# Patient Record
Sex: MALE | Race: WHITE | NOT HISPANIC OR LATINO | URBAN - METROPOLITAN AREA
[De-identification: names, ages, dates, MRNs, and addresses within clinical notes are randomized per-mention and may not be internally consistent; named-entity substitution may affect disease eponyms.]

---

## 2017-07-26 ENCOUNTER — OUTPATIENT (OUTPATIENT)
Dept: OUTPATIENT SERVICES | Facility: HOSPITAL | Age: 42
LOS: 1 days | End: 2017-07-26
Payer: COMMERCIAL

## 2017-07-26 ENCOUNTER — APPOINTMENT (OUTPATIENT)
Dept: RADIOLOGY | Facility: IMAGING CENTER | Age: 42
End: 2017-07-26

## 2017-07-26 DIAGNOSIS — Z00.8 ENCOUNTER FOR OTHER GENERAL EXAMINATION: ICD-10-CM

## 2017-07-26 PROCEDURE — 73630 X-RAY EXAM OF FOOT: CPT

## 2018-06-04 ENCOUNTER — OUTPATIENT (OUTPATIENT)
Dept: OUTPATIENT SERVICES | Facility: HOSPITAL | Age: 43
LOS: 1 days | End: 2018-06-04
Payer: COMMERCIAL

## 2018-06-04 ENCOUNTER — APPOINTMENT (OUTPATIENT)
Dept: MRI IMAGING | Facility: IMAGING CENTER | Age: 43
End: 2018-06-04
Payer: COMMERCIAL

## 2018-06-04 DIAGNOSIS — Z00.8 ENCOUNTER FOR OTHER GENERAL EXAMINATION: ICD-10-CM

## 2018-06-04 PROCEDURE — 73721 MRI JNT OF LWR EXTRE W/O DYE: CPT

## 2018-06-04 PROCEDURE — 73721 MRI JNT OF LWR EXTRE W/O DYE: CPT | Mod: 26,RT

## 2018-11-27 ENCOUNTER — TRANSCRIPTION ENCOUNTER (OUTPATIENT)
Age: 43
End: 2018-11-27

## 2019-01-01 ENCOUNTER — TRANSCRIPTION ENCOUNTER (OUTPATIENT)
Age: 44
End: 2019-01-01

## 2019-09-04 ENCOUNTER — LABORATORY RESULT (OUTPATIENT)
Age: 44
End: 2019-09-04

## 2019-09-04 ENCOUNTER — APPOINTMENT (OUTPATIENT)
Dept: PEDIATRIC ALLERGY IMMUNOLOGY | Facility: CLINIC | Age: 44
End: 2019-09-04
Payer: COMMERCIAL

## 2019-09-04 ENCOUNTER — NON-APPOINTMENT (OUTPATIENT)
Age: 44
End: 2019-09-04

## 2019-09-04 VITALS
DIASTOLIC BLOOD PRESSURE: 82 MMHG | SYSTOLIC BLOOD PRESSURE: 132 MMHG | HEART RATE: 90 BPM | WEIGHT: 206.99 LBS | OXYGEN SATURATION: 96 % | BODY MASS INDEX: 30.66 KG/M2 | HEIGHT: 69 IN

## 2019-09-04 DIAGNOSIS — Z13.0 ENCOUNTER FOR SCREENING FOR OTHER SUSPECTED ENDOCRINE DISORDER: ICD-10-CM

## 2019-09-04 DIAGNOSIS — Z13.228 ENCOUNTER FOR SCREENING FOR OTHER SUSPECTED ENDOCRINE DISORDER: ICD-10-CM

## 2019-09-04 DIAGNOSIS — J33.9 UNSPECIFIED ASTHMA, UNCOMPLICATED: ICD-10-CM

## 2019-09-04 DIAGNOSIS — J30.89 OTHER ALLERGIC RHINITIS: ICD-10-CM

## 2019-09-04 DIAGNOSIS — Z82.5 FAMILY HISTORY OF ASTHMA AND OTHER CHRONIC LOWER RESPIRATORY DISEASES: ICD-10-CM

## 2019-09-04 DIAGNOSIS — J30.81 ALLERGIC RHINITIS DUE TO ANIMAL (CAT) (DOG) HAIR AND DANDER: ICD-10-CM

## 2019-09-04 DIAGNOSIS — J33.9 NASAL POLYP, UNSPECIFIED: ICD-10-CM

## 2019-09-04 DIAGNOSIS — J30.1 ALLERGIC RHINITIS DUE TO POLLEN: ICD-10-CM

## 2019-09-04 DIAGNOSIS — Z88.6 UNSPECIFIED ASTHMA, UNCOMPLICATED: ICD-10-CM

## 2019-09-04 DIAGNOSIS — J45.30 MILD PERSISTENT ASTHMA, UNCOMPLICATED: ICD-10-CM

## 2019-09-04 DIAGNOSIS — J32.9 CHRONIC SINUSITIS, UNSPECIFIED: ICD-10-CM

## 2019-09-04 DIAGNOSIS — Z13.29 ENCOUNTER FOR SCREENING FOR OTHER SUSPECTED ENDOCRINE DISORDER: ICD-10-CM

## 2019-09-04 DIAGNOSIS — J45.909 UNSPECIFIED ASTHMA, UNCOMPLICATED: ICD-10-CM

## 2019-09-04 DIAGNOSIS — Z88.6 ALLERGY STATUS TO ANALGESIC AGENT: ICD-10-CM

## 2019-09-04 DIAGNOSIS — R09.81 NASAL CONGESTION: ICD-10-CM

## 2019-09-04 DIAGNOSIS — J33.9 CHRONIC SINUSITIS, UNSPECIFIED: ICD-10-CM

## 2019-09-04 PROCEDURE — 95004 PERQ TESTS W/ALRGNC XTRCS: CPT | Mod: GC

## 2019-09-04 PROCEDURE — 36415 COLL VENOUS BLD VENIPUNCTURE: CPT | Mod: GC

## 2019-09-04 PROCEDURE — 94010 BREATHING CAPACITY TEST: CPT | Mod: GC

## 2019-09-04 PROCEDURE — 95012 NITRIC OXIDE EXP GAS DETER: CPT | Mod: GC

## 2019-09-04 PROCEDURE — 99204 OFFICE O/P NEW MOD 45 MIN: CPT | Mod: GC,25

## 2019-09-04 RX ORDER — BUDESONIDE 32 UG/1
32 SPRAY, METERED NASAL
Refills: 0 | Status: ACTIVE | COMMUNITY

## 2019-09-04 RX ORDER — FLUTICASONE PROPIONATE AND SALMETEROL 50; 100 UG/1; UG/1
100-50 POWDER RESPIRATORY (INHALATION)
Refills: 0 | Status: ACTIVE | COMMUNITY

## 2019-09-04 RX ORDER — PREDNISONE 50 MG/1
TABLET ORAL
Refills: 0 | Status: ACTIVE | COMMUNITY

## 2019-09-04 RX ORDER — FLUTICASONE PROPIONATE AND SALMETEROL XINAFOATE 230; 21 UG/1; UG/1
AEROSOL, METERED RESPIRATORY (INHALATION)
Refills: 0 | Status: DISCONTINUED | COMMUNITY
End: 2019-09-04

## 2019-09-04 RX ORDER — ALBUTEROL 90 MCG
90 AEROSOL (GRAM) INHALATION
Refills: 0 | Status: ACTIVE | COMMUNITY

## 2019-09-04 RX ORDER — MONTELUKAST 10 MG/1
10 TABLET, FILM COATED ORAL
Refills: 0 | Status: ACTIVE | COMMUNITY

## 2019-09-06 ENCOUNTER — RESULT REVIEW (OUTPATIENT)
Age: 44
End: 2019-09-06

## 2019-09-06 LAB
ALBUMIN SERPL ELPH-MCNC: 4.8 G/DL
ALP BLD-CCNC: 83 U/L
ALT SERPL-CCNC: 30 U/L
ANION GAP SERPL CALC-SCNC: 15 MMOL/L
AST SERPL-CCNC: 24 U/L
BASOPHILS # BLD AUTO: 0.03 K/UL
BASOPHILS NFR BLD AUTO: 0.3 %
BILIRUB SERPL-MCNC: 0.4 MG/DL
BUN SERPL-MCNC: 21 MG/DL
CALCIUM SERPL-MCNC: 9.9 MG/DL
CH50 SERPL-MCNC: >98 U/ML
CHLORIDE SERPL-SCNC: 102 MMOL/L
CO2 SERPL-SCNC: 23 MMOL/L
CREAT SERPL-MCNC: 1.07 MG/DL
DEPRECATED KAPPA LC FREE/LAMBDA SER: 1.16 RATIO
EOSINOPHIL # BLD AUTO: 0.02 K/UL
EOSINOPHIL NFR BLD AUTO: 0.2 %
GLUCOSE SERPL-MCNC: 134 MG/DL
HCT VFR BLD CALC: 49.9 %
HGB BLD-MCNC: 16.2 G/DL
IGA SER QL IEP: 165 MG/DL
IGG SER QL IEP: 1088 MG/DL
IGM SER QL IEP: 58 MG/DL
IMM GRANULOCYTES NFR BLD AUTO: 0.4 %
KAPPA LC CSF-MCNC: 0.87 MG/DL
KAPPA LC SERPL-MCNC: 1.01 MG/DL
LYMPHOCYTES # BLD AUTO: 2.04 K/UL
LYMPHOCYTES NFR BLD AUTO: 18.8 %
MAN DIFF?: NORMAL
MCHC RBC-ENTMCNC: 29.2 PG
MCHC RBC-ENTMCNC: 32.5 GM/DL
MCV RBC AUTO: 90.1 FL
MONOCYTES # BLD AUTO: 0.55 K/UL
MONOCYTES NFR BLD AUTO: 5.1 %
MPO AB + PR3 PNL SER: NORMAL
NEUTROPHILS # BLD AUTO: 8.19 K/UL
NEUTROPHILS NFR BLD AUTO: 75.2 %
PLATELET # BLD AUTO: 285 K/UL
POTASSIUM SERPL-SCNC: 3.5 MMOL/L
PROT SERPL-MCNC: 7.5 G/DL
RBC # BLD: 5.54 M/UL
RBC # FLD: 13 %
SODIUM SERPL-SCNC: 140 MMOL/L
TRYPTASE: 7.2 NG/ML
WBC # FLD AUTO: 10.87 K/UL

## 2019-09-09 LAB
COMPLEMENT, ALTERNATE PATHWAY (AH50): 72
LEUKOTRIENE E4, URINE: 118 CD:455662145

## 2019-09-12 LAB
MANNAN BINDING LECTIN (MBL): 594 NG/ML
TOTAL IGE SMQN RAST: 41 KU/L

## 2019-09-12 NOTE — HISTORY OF PRESENT ILLNESS
[Venom Reactions] : venom reactions [Food Allergies] : food allergies [(# ___ in the past year)] : hospitalized [unfilled] times in the past year [( # ___ in the past year)] : intubated [unfilled] times in the past year [0 x/month] : 0 x/month [None] : None [< or = 2 days/wk] : < than or = 2 days/week [0 - 1/year] : 0 - 1/year [de-identified] : Faustino is a 44 year old man referred by Dr. Pelletier from ENT for evaluation of AERD and allergies. \maria fernanda patricia For approximately 10 years had nasal congestion and was seen by ENT and diagnosed with nasal polyps. Has been on prednisone 3 times since March. Prednisone is the only thing that allows him to smell and breath out his nose. ENT recommeneded evaluation prior to surgery. \maria fernanda patricia Has taken ibuprofen intermittently for muscle pains and sports injuries. Years ago he was out fishing and developed worsening of asthma and wheezing and thought it was associated with advil.\par \par Asthma on advair and worse in late summer through fall and worse also with mold and damp musty places. Diagnosed initially in 2006 when his wife moved in with her cats, he developed wheezing. harshad patricia Was allergy tested years ago and allergic to pollens and ragweed. Regularly takes singulair and zyrtec daily. Sometimes will stop taking in the winter months. Also reports itchy eyes, itchy nose, nasal congestion, itchy throat, post nasal drip. Also gets vertigo with severe nasal congestion.\maria fernanda patricia Also as child had intermittent hives and lip swelling. [Shortness of Breath] : no shortness of breath [Dyspnea on Exertion] : no dyspnea on exertion [Chest Pain] : no chest pain [Cough] : no cough [Sputum Production] : non productive cough [Wheezing] : no wheezing

## 2019-09-12 NOTE — PHYSICAL EXAM
[Alert] : alert [Well Nourished] : well nourished [No Acute Distress] : no acute distress [Well Developed] : well developed [Normal Voice/Communication] : normal voice communication [Normal Pupil & Iris Size/Symmetry] : normal pupil and iris size and symmetry [No Discharge] : no discharge [No Photophobia] : no photophobia [Sclera Not Icteric] : sclera not icteric [Normal TMs] : both tympanic membranes were normal [Normal Outer Ear/Nose] : the ears and nose were normal in appearance [No Thrush] : no thrush [No Nasal Discharge] : no nasal discharge [Normal Lips/Tongue] : the lips and tongue were normal [Normal Tonsils] : normal tonsils [Normal Dentition] : normal dentition [No Oral Lesions or Ulcers] : no oral lesions or ulcers [No Neck Mass] : no neck mass was observed [No LAD] : no lymphadenopathy [Supple] : the neck was supple [Normal Rate and Effort] : normal respiratory rhythm and effort [No Crackles] : no crackles [Bilateral Audible Breath Sounds] : bilateral audible breath sounds [No Retractions] : no retractions [Normal Rate] : heart rate was normal  [Normal S1, S2] : normal S1 and S2 [No murmur] : no murmur [No Rubs] : no pericardial rub [Regular Rhythm] : with a regular rhythm [Skin Intact] : skin intact  [Normal Cervical Lymph Nodes] : cervical [No Skin Lesions] : no skin lesions [No Rash] : no rash [No Induration] : no induration [No Joint Swelling or Erythema] : no joint swelling or erythema [No Motor Deficits] : the motor exam was normal [Alert, Awake, Oriented as Age-Appropriate] : alert, awake, oriented as age appropriate [Conjunctival Erythema] : no conjunctival erythema [Suborbital Bogginess] : no suborbital bogginess (allergic shiners) [Pharyngeal erythema] : no pharyngeal erythema [Exudate] : no exudate [Clear Rhinorrhea] : no clear rhinorrhea was seen [Eczematous Patches] : no eczematous patches [Wheezing] : no wheezing was heard [Xerosis] : no xerosis [de-identified] : +large nasal polyps in bilateral nares

## 2019-09-12 NOTE — SOCIAL HISTORY
[Spouse/Partner] : spouse/partner [House] : [unfilled] lives in a house  [Radiator/Baseboard] : heating provided by radiator(s)/baseboard(s) [Window Units] : air conditioning provided by window units [Dry] : dry [None] : none [Previous Pets ___] : previously owned pets [unfilled] [] :  [de-identified] : 2 kids [Dehumidifier] : does not use a dehumidifier [Humidifier] : does not use a humidifier [Cockroaches] : Patient states that there are no cockroaches in the home [Bedroom] : not in the bedroom [Basement] : not in the basement [Living Area] : not in the living area [Smokers in Household] : there are no smokers in the home

## 2019-09-12 NOTE — IMPRESSION
[Allergy Testing Weeds] : weeds [] : molds [Allergy Testing Cat] : cat [Allergy Testing Grasses] : grasses [Allergy Testing Dust Mite] : dust mites [Allergy Testing Mixed Feathers] : feathers [Allergy Testing Cockroach] : cockroach [Allergy Testing Dog] : dog [________] : [unfilled] [Spirometry] : Spirometry [Normal Spirometry] : spirometry normal [FreeTextEntry2] : FeNO 25

## 2019-09-12 NOTE — REVIEW OF SYSTEMS
[Rhinorrhea] : rhinorrhea [Nasal Congestion] : nasal congestion [Snoring] : snoring [Hoarseness] : hoarseness [Nasal Itching] : nasal itching [Post Nasal Drip] : post nasal drip [Throat Itching] : throat itching [Nl] : Genitourinary [Immunizations are up to date] : Immunizations are up to date [Fatigue] : no fatigue [Fever] : no fever [Wgt Loss (___ Lbs)] : no recent weight loss [Decreased Appetite] : no decrease in appetite [Sneezing] : no sneezing [Puffy Eyelids] : no puffy ~T eyelids [Difficulty Breathing] : no dyspnea [Cough] : no cough [SOB with Exertion] : no dyspnea on exertion [Wheezing] : no wheezing [Nausea] : no nausea [Vomiting] : no vomiting [Diarrhea] : no diarrhea [Abdominal Pain] : no abdominal pain [Urticaria] : no urticaria [Atopic Dermatitis] : no atopic dermatitis [Pruritis] : no pruritis [Swelling] : no swelling

## 2019-09-12 NOTE — REASON FOR VISIT
[Initial Consultation] : an initial consultation for [FreeTextEntry2] : Aspirin Exacerbated Respiratory Disease (AERD) and allergies

## 2019-09-12 NOTE — CONSULT LETTER
[Dear  ___] : Dear  [unfilled], [Consult Letter:] : I had the pleasure of evaluating your patient, [unfilled]. [Please see my note below.] : Please see my note below. [Consult Closing:] : Thank you very much for allowing me to participate in the care of this patient.  If you have any questions, please do not hesitate to contact me. [Sincerely,] : Sincerely, [DrJoby ___] : Dr. CARNES [FreeTextEntry2] : Reji Pelletier MD [FreeTextEntry3] : Candi Collado MD\par Fellow, Division of Allergy/Immunology \par [facility]\par \par MD ELISABET Hagan FACAAI\par Adult and Pediatric Allergy, Asthma and Clinical Immunology\par  of Medicine and Pediatrics at\par   [facility]\par Section Head, Adult Allergy and Immunology\par   [facility]\par   Division of Allergy, Asthma and Immunology\par   [address]\par   [place], [place] [address]\par   Phone [phone]  Fax [phone]\par \par \par \par [facility]\par \par

## 2019-10-22 ENCOUNTER — OUTPATIENT (OUTPATIENT)
Dept: OUTPATIENT SERVICES | Facility: HOSPITAL | Age: 44
LOS: 1 days | End: 2019-10-22

## 2019-10-22 VITALS
WEIGHT: 210.1 LBS | TEMPERATURE: 98 F | HEIGHT: 69 IN | OXYGEN SATURATION: 97 % | RESPIRATION RATE: 14 BRPM | SYSTOLIC BLOOD PRESSURE: 136 MMHG | HEART RATE: 80 BPM | DIASTOLIC BLOOD PRESSURE: 84 MMHG

## 2019-10-22 DIAGNOSIS — J32.1 CHRONIC FRONTAL SINUSITIS: ICD-10-CM

## 2019-10-22 DIAGNOSIS — Z90.89 ACQUIRED ABSENCE OF OTHER ORGANS: Chronic | ICD-10-CM

## 2019-10-22 DIAGNOSIS — J32.9 CHRONIC SINUSITIS, UNSPECIFIED: ICD-10-CM

## 2019-10-22 LAB
ANION GAP SERPL CALC-SCNC: 18 MMO/L — HIGH (ref 7–14)
BUN SERPL-MCNC: 24 MG/DL — HIGH (ref 7–23)
CALCIUM SERPL-MCNC: 9.7 MG/DL — SIGNIFICANT CHANGE UP (ref 8.4–10.5)
CHLORIDE SERPL-SCNC: 102 MMOL/L — SIGNIFICANT CHANGE UP (ref 98–107)
CO2 SERPL-SCNC: 22 MMOL/L — SIGNIFICANT CHANGE UP (ref 22–31)
CREAT SERPL-MCNC: 1.16 MG/DL — SIGNIFICANT CHANGE UP (ref 0.5–1.3)
GLUCOSE SERPL-MCNC: 93 MG/DL — SIGNIFICANT CHANGE UP (ref 70–99)
HCT VFR BLD CALC: 53.7 % — HIGH (ref 39–50)
HGB BLD-MCNC: 17.2 G/DL — HIGH (ref 13–17)
MCHC RBC-ENTMCNC: 28.7 PG — SIGNIFICANT CHANGE UP (ref 27–34)
MCHC RBC-ENTMCNC: 32 % — SIGNIFICANT CHANGE UP (ref 32–36)
MCV RBC AUTO: 89.6 FL — SIGNIFICANT CHANGE UP (ref 80–100)
NRBC # FLD: 0 K/UL — SIGNIFICANT CHANGE UP (ref 0–0)
PLATELET # BLD AUTO: 266 K/UL — SIGNIFICANT CHANGE UP (ref 150–400)
PMV BLD: 10.4 FL — SIGNIFICANT CHANGE UP (ref 7–13)
POTASSIUM SERPL-MCNC: 3.5 MMOL/L — SIGNIFICANT CHANGE UP (ref 3.5–5.3)
POTASSIUM SERPL-SCNC: 3.5 MMOL/L — SIGNIFICANT CHANGE UP (ref 3.5–5.3)
RBC # BLD: 5.99 M/UL — HIGH (ref 4.2–5.8)
RBC # FLD: 12.9 % — SIGNIFICANT CHANGE UP (ref 10.3–14.5)
SODIUM SERPL-SCNC: 142 MMOL/L — SIGNIFICANT CHANGE UP (ref 135–145)
WBC # BLD: 8.31 K/UL — SIGNIFICANT CHANGE UP (ref 3.8–10.5)
WBC # FLD AUTO: 8.31 K/UL — SIGNIFICANT CHANGE UP (ref 3.8–10.5)

## 2019-10-22 RX ORDER — LANOLIN ALCOHOL/MO/W.PET/CERES
500 CREAM (GRAM) TOPICAL
Qty: 0 | Refills: 0 | DISCHARGE

## 2019-10-22 NOTE — H&P PST ADULT - NSANTHOSAYNRD_GEN_A_CORE
No. IVAN screening performed.  STOP BANG Legend: 0-2 = LOW Risk; 3-4 = INTERMEDIATE Risk; 5-8 = HIGH Risk

## 2019-10-22 NOTE — H&P PST ADULT - NEGATIVE CARDIOVASCULAR SYMPTOMS
no claudication/no dyspnea on exertion/no chest pain/no peripheral edema/no palpitations/no orthopnea/no paroxysmal nocturnal dyspnea

## 2019-10-22 NOTE — H&P PST ADULT - NSICDXPASTMEDICALHX_GEN_ALL_CORE_FT
PAST MEDICAL HISTORY:  Asthma due to seasonal allergies     Chronic sinusitis     Insomnia     Seasonal allergies

## 2019-10-22 NOTE — H&P PST ADULT - NSICDXPROBLEM_GEN_ALL_CORE_FT
PROBLEM DIAGNOSES  Problem: Chronic sinusitis  Assessment and Plan: preop instructions provided including NPO status, pepcid. Aware to stop any OTC MVI/ herbals today. Allergic to NSAIDs, OR booking aware. BW sent and awaiting results.

## 2019-10-22 NOTE — H&P PST ADULT - NEGATIVE PSYCHIATRIC SYMPTOMS
no paranoia/no mood swings/no memory loss/no hyperactivity/no visual hallucinations/no agitation/no auditory hallucinations/no suicidal ideation/no depression/no anxiety

## 2019-10-22 NOTE — H&P PST ADULT - NEGATIVE ENMT SYMPTOMS
no hearing difficulty/no ear pain/no nose bleeds/no recurrent cold sores/no dry mouth/no tinnitus/no abnormal taste sensation/no gum bleeding/no throat pain/no dysphagia

## 2019-10-22 NOTE — H&P PST ADULT - ENMT COMMENTS
DX: DX:  hypertrophy of nasal turbinates, chronic maxillary sinusitis, chronic frontal sinusitis, chronic ethmoidal sinusitis, chronic sphenoidal sinusitis, deviated septum

## 2019-10-22 NOTE — H&P PST ADULT - NEGATIVE GASTROINTESTINAL SYMPTOMS
no steatorrhea/no constipation/no change in bowel habits/no hematochezia/no hiccoughs/no jaundice/no vomiting/no diarrhea/no flatulence/no abdominal pain/no nausea/no melena

## 2019-10-22 NOTE — H&P PST ADULT - HISTORY OF PRESENT ILLNESS
43 y/o male  States since 14 years ago develop pet went to see ENT and medicine treatment ordered but "not longer working". Recently re evaluated w/ a CT scan of sinuses and revealed nasal polyps, sinus inflamation. recommvention at this time ended surgical inter 45 y/o male presents to [facility] w/ a preop dx of hypertrophy of nasal turbinates, chronic maxillary sinusitis, chronic frontal sinusitis, chronic ethmoidal sinusitis, chronic sphenoidal sinusitis, deviated septum and to be evaluated for a scheduled functional endoscopic sinus surgery on 10/29/19.   Pt. states since 14 years ago develop chronic sinus infections, obstruction so went to see ENT and medicine treatment axb, allergy meds, steroids ordered but "not longer working". Recently re evaluated w/ a CT scan of sinuses and revealed nasal polyps and sinus inflammation so recommended sinus sx at this time.

## 2019-10-22 NOTE — H&P PST ADULT - RS GEN PE MLT RESP DETAILS PC
breath sounds equal/no intercostal retractions/airway patent/no rhonchi/no wheezes/no rales/no chest wall tenderness/good air movement/clear to auscultation bilaterally

## 2019-10-22 NOTE — H&P PST ADULT - ASSESSMENT
DX: DX:  hypertrophy of nasal turbinates, chronic maxillary sinusitis, chronic frontal sinusitis, chronic ethmoidal sinusitis, chronic sphenoidal sinusitis, deviated septum and evaluated for a scheduled functional endoscopic sinus surgery on 10/29/19.

## 2019-10-23 ENCOUNTER — TRANSCRIPTION ENCOUNTER (OUTPATIENT)
Age: 44
End: 2019-10-23

## 2019-10-28 ENCOUNTER — TRANSCRIPTION ENCOUNTER (OUTPATIENT)
Age: 44
End: 2019-10-28

## 2019-10-29 ENCOUNTER — OUTPATIENT (OUTPATIENT)
Dept: OUTPATIENT SERVICES | Facility: HOSPITAL | Age: 44
LOS: 1 days | Discharge: HOME | End: 2019-10-29
Payer: COMMERCIAL

## 2019-10-29 ENCOUNTER — RESULT REVIEW (OUTPATIENT)
Age: 44
End: 2019-10-29

## 2019-10-29 VITALS
HEIGHT: 69 IN | OXYGEN SATURATION: 99 % | RESPIRATION RATE: 18 BRPM | WEIGHT: 210.1 LBS | TEMPERATURE: 98 F | DIASTOLIC BLOOD PRESSURE: 80 MMHG | HEART RATE: 92 BPM | SYSTOLIC BLOOD PRESSURE: 138 MMHG

## 2019-10-29 VITALS
TEMPERATURE: 98 F | DIASTOLIC BLOOD PRESSURE: 78 MMHG | HEART RATE: 78 BPM | RESPIRATION RATE: 16 BRPM | OXYGEN SATURATION: 97 % | SYSTOLIC BLOOD PRESSURE: 133 MMHG

## 2019-10-29 DIAGNOSIS — Z90.89 ACQUIRED ABSENCE OF OTHER ORGANS: Chronic | ICD-10-CM

## 2019-10-29 DIAGNOSIS — J32.1 CHRONIC FRONTAL SINUSITIS: ICD-10-CM

## 2019-10-29 PROCEDURE — 88304 TISSUE EXAM BY PATHOLOGIST: CPT | Mod: 26

## 2019-10-29 RX ORDER — CETIRIZINE HYDROCHLORIDE 10 MG/1
1 TABLET ORAL
Qty: 0 | Refills: 0 | DISCHARGE

## 2019-10-29 RX ORDER — FLUTICASONE PROPIONATE AND SALMETEROL 50; 250 UG/1; UG/1
1 POWDER ORAL; RESPIRATORY (INHALATION)
Qty: 0 | Refills: 0 | DISCHARGE

## 2019-10-29 RX ORDER — LANOLIN ALCOHOL/MO/W.PET/CERES
500 CREAM (GRAM) TOPICAL
Qty: 0 | Refills: 0 | DISCHARGE

## 2019-10-29 RX ORDER — MONTELUKAST 4 MG/1
1 TABLET, CHEWABLE ORAL
Qty: 0 | Refills: 0 | DISCHARGE

## 2019-10-29 NOTE — ASU DISCHARGE PLAN (ADULT/PEDIATRIC) - PATIENT EDUCATION MATERIALS PROVIED
Pre-printed instructions given for other (specify)/RN discharge instructions/Provider pre-printed instructions given

## 2019-10-29 NOTE — ASU DISCHARGE PLAN (ADULT/PEDIATRIC) - NURSING INSTRUCTIONS
You were given IV antibiotics in the OR. IF you are prescribed oral antibiotics to take at home. Please follow directions on the bottle and start your first dose of antibiotics before bedtime, and schedule your own timing until you finish all antibiotics. DO NOT DISCONTINUE ON YOUR OWN.     You were given IV Tylenol (1000mg) for pain management in the Operating Room.  Please do NOT take any additonal tylenol/acetaminophen products (Percocet, Vicodin, Excedrin) for the next 6-8 hours (after  230PM ). Please do not take tylenol AND percocet/vicodin/excedrin as narcotic prescription already contains tylenol.

## 2019-10-29 NOTE — ASU DISCHARGE PLAN (ADULT/PEDIATRIC) - CALL YOUR DOCTOR IF YOU HAVE ANY OF THE FOLLOWING:
Fever greater than (need to indicate Fahrenheit or Celsius)/Pain not relieved by Medications/Wound/Surgical Site with redness, or foul smelling discharge or pus/Bleeding that does not stop

## 2019-10-29 NOTE — ASU DISCHARGE PLAN (ADULT/PEDIATRIC) - ASU DC SPECIAL INSTRUCTIONSFT
You may experience bloody drainage the first 24 hours after surgery. You may change the dressing every 20-30 minutes as needed. Drainage should decrease after the second day.  Do NOT take any aspirin or ibuprofen containing products (Motrin, Aleve, Advil). Only take tylenol or presciption pain medication.   Do NOT take/restart blood thinners or antiplatelet medications (baby aspirin, adult aspirin, coumadin, warfarin, plavix) until cleared by MD.  Please take all antibiotics as prescribed.    Refer to MD's post-op instructions sheet.

## 2019-11-04 LAB — SURGICAL PATHOLOGY STUDY: SIGNIFICANT CHANGE UP

## 2019-11-23 ENCOUNTER — TRANSCRIPTION ENCOUNTER (OUTPATIENT)
Age: 44
End: 2019-11-23

## 2020-03-09 NOTE — H&P PST ADULT - CARDIOVASCULAR DETAILS
pt seen here on friday for htn, c.o elevated bp with ha. takes lisinopril. also reports generalized weakness. fs 86 positive S2/positive S1

## 2020-04-25 ENCOUNTER — MESSAGE (OUTPATIENT)
Age: 45
End: 2020-04-25

## 2020-05-06 PROBLEM — J45.909 UNSPECIFIED ASTHMA, UNCOMPLICATED: Chronic | Status: ACTIVE | Noted: 2019-10-22

## 2020-05-06 PROBLEM — G47.00 INSOMNIA, UNSPECIFIED: Chronic | Status: ACTIVE | Noted: 2019-10-22

## 2020-05-06 PROBLEM — J32.9 CHRONIC SINUSITIS, UNSPECIFIED: Chronic | Status: ACTIVE | Noted: 2019-10-22

## 2020-05-06 PROBLEM — J30.2 OTHER SEASONAL ALLERGIC RHINITIS: Chronic | Status: ACTIVE | Noted: 2019-10-22

## 2020-05-07 ENCOUNTER — APPOINTMENT (OUTPATIENT)
Dept: DISASTER EMERGENCY | Facility: CLINIC | Age: 45
End: 2020-05-07

## 2020-05-14 ENCOUNTER — APPOINTMENT (OUTPATIENT)
Dept: DISASTER EMERGENCY | Facility: CLINIC | Age: 45
End: 2020-05-14

## 2020-05-19 LAB
SARS-COV-2 IGG SERPL IA-ACNC: 0.1 INDEX
SARS-COV-2 IGG SERPL QL IA: NEGATIVE

## 2021-01-05 PROBLEM — J45.909 ASPIRIN-SENSITIVE ASTHMA WITH NASAL POLYPS: Status: ACTIVE | Noted: 2019-09-04

## 2022-10-07 ENCOUNTER — APPOINTMENT (OUTPATIENT)
Dept: ORTHOPEDIC SURGERY | Facility: CLINIC | Age: 47
End: 2022-10-07

## 2022-10-07 ENCOUNTER — NON-APPOINTMENT (OUTPATIENT)
Age: 47
End: 2022-10-07

## 2022-10-07 VITALS
DIASTOLIC BLOOD PRESSURE: 83 MMHG | HEIGHT: 69 IN | WEIGHT: 210 LBS | BODY MASS INDEX: 31.1 KG/M2 | SYSTOLIC BLOOD PRESSURE: 125 MMHG | HEART RATE: 79 BPM

## 2022-10-07 DIAGNOSIS — M17.12 UNILATERAL PRIMARY OSTEOARTHRITIS, LEFT KNEE: ICD-10-CM

## 2022-10-07 DIAGNOSIS — M25.561 PAIN IN RIGHT KNEE: ICD-10-CM

## 2022-10-07 PROCEDURE — 99203 OFFICE O/P NEW LOW 30 MIN: CPT

## 2022-10-07 PROCEDURE — 73564 X-RAY EXAM KNEE 4 OR MORE: CPT | Mod: 50

## 2022-10-09 PROBLEM — M17.12 PATELLOFEMORAL ARTHRITIS OF LEFT KNEE: Status: ACTIVE | Noted: 2022-10-09

## 2022-10-09 NOTE — PHYSICAL EXAM
[de-identified] : Constitutional: 47 year old male, alert and oriented, cooperative, in no acute distress.\par \par HEENT \par NC/AT.  Appearance: symmetric\par \par Neck/Back\par Straight without deformity or instability.  Good ROM.\par \par Chest/Respiratory \par Respiratory effort: no intercostal retractions or use of accessory muscles. Nonlabored Breathing\par \par Mental Status: \par Judgment, insight: intact\par Orientation: oriented to time, place, and person\par \par Neurological:\par Sensory and Motor are grossly intact throughout\par \par Left Knee\par \par Inspection:\par     Skin intact, no rashes or lesions\par     No Effusion\par     Non-tender to palpation over tibial tubercle, patella, medial and lateral joint line, and pes insertion.\par \par Range of Motion:\par 	Extension - 0 degrees\par 	Flexion - 120 degrees\par 	Extensor lag: None\par \par Stability:\par      Demonstrates no Varus or Valgus instability\par      Negative Anterior or Posterior drawer.\par      Negative Lachman's\par \par Patella: stable, tracks well. \par \par Right Knee\par \par Inspection:\par     Skin intact, no rashes or lesions\par     Minimal Effusion\par     Non-tender to palpation over tibial tubercle, patella, medial and lateral joint line, and pes insertion.\par     Mild tenderness over posterior knee capsule\par \par Range of Motion: At 120 degrees flexion, patient feels some posterior knee tenderness\par 	Extension - 0 degrees\par 	Flexion - 120 degrees\par 	Extensor lag: None\par \par Stability:\par      Demonstrates no Varus or Valgus instability\par      Negative Anterior or Posterior drawer.\par      Negative Lachman's\par \par Patella: stable, tracks well. \par \par Gait: nonantalgic\par \par Neurologic Exam\par     Motor intact including 5/5 Extensor Hallucis Longus, 5/5 Flexor Hallucis Longus, 5/5 Tibialis Anterior and 5/5 Gastrocnemius\par     Sensation Intact to Light Touch including Saphenous, Sural, Superficial Peroneal, Deep Peroneal, Tibial nerve distributions\par \par Vascular Exam\par     Feet are warm and well perfused with 2+ Dorsalis Pedis Pulses\par \par No pain with range of motion of the bilateral hips. No lumbar paraspinal muscle tenderness.  [de-identified] : XRay:  XRays of the Bilateral Knees (4 Views of each knee, 5 XRays total) taken in the office today and discussed with the patient. XRays demonstrate no obvious fracture or dislocation. There is mild joint space narrowing in the medial compartment of the bilateral knees. The left knee shows joint space narrowing in the patellofemoral compartment, with osteophyte formation, consistent with patellofemoral osteoarthritis. (my personal interpretation).\par

## 2022-10-09 NOTE — DISCUSSION/SUMMARY
[de-identified] : Mr. Nunes is a 47-year-old male who presented to the office for evaluation of his bilateral knee pain.  For his right knee, patient has been experiencing posterior knee pain following a possible injury while exercising about 1 week ago.  The knee pain has significantly improved and he only has mild pain at this time.  For patient's left knee, he has a history of chronic distal quadriceps and anterior knee pain and aching.  X-rays showed mild bilateral knee osteoarthritis, most significant in the patellofemoral compartment of the left knee.  Discussed that patient's right knee pain is likely secondary to to a posterior capsular knee strain or muscle strain.  Discussed the management of right knee strain, including physical therapy and anti-inflammatories.  Discussed that patient's left knee pain is likely secondary to his patellofemoral osteoarthritis.  Discussed the operative and nonoperative management of knee osteoarthritis.  Patient would like to continue nonoperative management at this time.  Discussed the nonoperative management of knee osteoarthritis, including physical therapy and anti-inflammatories.  Patient would like to continue his home exercises and leg exercises on his own. Discussed focusing on quadriceps strengthening for his left knee pain. He would not like an anti-inflammatory at this time.  He will rest his legs at this time and avoid strenuous lower extremity exercises until his knee pain has improved.  Patient will follow-up as needed for his bilateral knee pain.  Patient understanding and agreement with the plan.  All questions answered.\par \par Plan:\par - Continue with home exercises, especially quadriceps strengthening for left knee pain\par - Follow up as needed for bilateral knee pain.

## 2022-10-09 NOTE — REVIEW OF SYSTEMS
[Joint Pain] : joint pain [Joint Stiffness] : no joint stiffness [Joint Swelling] : joint swelling [Fever] : no fever [Chills] : no chills [FreeTextEntry5] : No history of heart attack [FreeTextEntry8] : No reported kidney issues [de-identified] : No fevers or chills [de-identified] : No numbness or tingling

## 2022-10-09 NOTE — HISTORY OF PRESENT ILLNESS
[de-identified] : Mr. Nunes is a 47 year old male who presented to the office for evaluation of his bilateral knee pain. For patient's right knee pain, about 1 week ago he was working out in the gym, doing a variety of lower extremity exercises.  Following these exercises, patient felt some posterior knee pain and swelling in the knee.  Patient felt that he may have hyperextended his knee during these exercises.  For a few days, patient experienced moderate knee pain that was worse with ambulation.  However, over the last few days, patient has experienced significant improvement in the knee pain and currently only has some mild symptoms.  Patient has experienced the symptoms previously, a few years ago, following similar exercises and activities.  Patient is now able to ambulate well and is overall improving.  For his left knee pain, patient has been experiencing chronic distal quadriceps aching and anterior knee pain.  Pain is worse with flexion activities such as squatting or kneeling on that side.  There is been no recent trauma on the left side.  No fevers chills. No hip or groin pain. Patient remains able to perform activities.  Patient is very active and exercises regularly.  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  He is an avid shark jamal and conservationist.

## 2023-03-20 ENCOUNTER — NON-APPOINTMENT (OUTPATIENT)
Age: 48
End: 2023-03-20

## 2023-04-04 NOTE — H&P PST ADULT - NS PRO REFERRAL CMGT
[facility] at [phone]   Please call our office if you have questions.     What: LUNG TEST   Where: IMAGING DEPARTMENT - main floor     [facility]   [id number] [place] [address]     Please follow up with the above recommendation within the next couple of days, if you have any further questions or concerns, please contact our office at Dr. Conteh:    [facility] at [phone]      Thank you for choosing this clinic for your medical care.     Pulmonary and Sleep Medicine  [facility]   
None

## 2023-05-26 ENCOUNTER — NON-APPOINTMENT (OUTPATIENT)
Age: 48
End: 2023-05-26

## 2024-11-29 ENCOUNTER — NON-APPOINTMENT (OUTPATIENT)
Age: 49
End: 2024-11-29